# Patient Record
(demographics unavailable — no encounter records)

---

## 2025-06-12 NOTE — HISTORY OF PRESENT ILLNESS
[FreeTextEntry1] : Soren is a 29-year-old right-handed woman with a history of depression and ADHD presenting to clinic for evaluation of dizziness.  She reports that episodes started about two months ago, though they are slightly better now. She describes feeling suddenly unsteady, with some room spinning. At first she thought it was related to dehydration and made sure to drink enough water. Then she thought it was related to skipping meals so she ate small frequent meals during the day. The dizziness/room spinning/light-headedness would be so intense she would sometimes feel she needed to grab the wall.  Her vision was blurry. Not triggered by change in head position. Would happen just randomly, even if not moving and watching TV. Lasts maybe one minute.  Sometimes she feels she has to hold onto something. Has not fallen. It can occur three times a week. No ringing in the ears, no hearing loss. No recent URI.  No changes in medications around the time of the event.   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

## 2025-06-12 NOTE — DISCUSSION/SUMMARY
[FreeTextEntry1] : Soren is a 29-year-old RH woman with a history of ADHD and depression presenting to clinic for the evaluation of dizziness. Vertigo comes and goes. Will complete work up including MRI brain (rule out posterior circulation stroke), vestibular therapy. Will check b12 and vitamin d.

## 2025-06-12 NOTE — ASSESSMENT
[FreeTextEntry1] : Please get MRI of the brain - we will call you once we get insurance authorization (can call me after the MRI is done to review the results) Please get labwork Please go to vestibular therapy RTC in 3-4 months to see Dr. Kennedy

## 2025-06-12 NOTE — PHYSICAL EXAM
[FreeTextEntry1] : Mental Status: AxOx3, euthymic affect, Short term memory: immediate 3/3, after 3 minutes 3/3. Gives detailed history.  Language/Speech : speech fluent  Cranial Nerves  II: Pupils equal and reactive,  VFF full III, IV, VI: EOMI V : normal sensation in V1-V3 b/l VII : no asymmetry, no nasolabial fold flattening VIII: normal hearing to voice  IX, X: normal palatal elevation, uvula midline XI: 5/5 head turn and 5/5 shoulder shrug bilaterally XII : midline tongue protrusion Vestibular exam: ? of positive head impulse test when turning head to the left, - boris hallpike maneuver Motor: no drift in limbs, normal bulk and tone, 5/5 in all extremities Sensory: normal to light touch and vibration Reflexes: brachioradialis, biceps, triceps, patella and ankles 1+ bilaterally Toes are down-going  Coordination: Normal finger to nose Gait: normal balance and gait, able to toe/heel/tandem

## 2025-06-12 NOTE — HISTORY OF PRESENT ILLNESS
[FreeTextEntry1] : Soren is a 29-year-old right-handed woman with a history of depression and ADHD presenting to clinic for evaluation of dizziness.  She reports that episodes started about two months ago, though they are slightly better now. She describes feeling suddenly unsteady, with some room spinning. At first she thought it was related to dehydration and made sure to drink enough water. Then she thought it was related to skipping meals so she ate small frequent meals during the day. The dizziness/room spinning/light-headedness would be so intense she would sometimes feel she needed to grab the wall.  Her vision was blurry. Not triggered by change in head position. Would happen just randomly, even if not moving and watching TV. Lasts maybe one minute.  Sometimes she feels she has to hold onto something. Has not fallen. It can occur three times a week. No ringing in the ears, no hearing loss. No recent URI.  No changes in medications around the time of the event.   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